# Patient Record
Sex: FEMALE | Race: WHITE | Employment: UNEMPLOYED | URBAN - METROPOLITAN AREA
[De-identification: names, ages, dates, MRNs, and addresses within clinical notes are randomized per-mention and may not be internally consistent; named-entity substitution may affect disease eponyms.]

---

## 2019-03-07 ENCOUNTER — HOSPITAL ENCOUNTER (OUTPATIENT)
Dept: ULTRASOUND IMAGING | Age: 59
Discharge: HOME | End: 2019-03-09
Payer: MEDICAID

## 2019-03-07 DIAGNOSIS — R10.11 RIGHT UPPER QUADRANT PAIN: ICD-10-CM

## 2019-03-07 PROCEDURE — 76700 US EXAM ABDOM COMPLETE: CPT

## 2019-03-12 ENCOUNTER — HOSPITAL ENCOUNTER (OUTPATIENT)
Dept: LAB | Age: 59
Discharge: HOME | End: 2019-03-12
Payer: MEDICAID

## 2019-03-12 LAB
ALBUMIN SERPL-MCNC: 4.2 G/DL (ref 3.5–4.6)
ALP BLD-CCNC: 93 U/L (ref 40–130)
ALT SERPL-CCNC: 14 U/L (ref 0–33)
ANION GAP SERPL CALCULATED.3IONS-SCNC: 18 MEQ/L (ref 9–15)
AST SERPL-CCNC: 20 U/L (ref 0–35)
BASOPHILS ABSOLUTE: 0.1 K/UL (ref 0–0.2)
BASOPHILS RELATIVE PERCENT: 1 %
BILIRUB SERPL-MCNC: 0.3 MG/DL (ref 0.2–0.7)
BUN BLDV-MCNC: 16 MG/DL (ref 8–23)
C-REACTIVE PROTEIN: 1.3 MG/L (ref 0–5)
CALCIUM SERPL-MCNC: 8.9 MG/DL (ref 8.5–9.9)
CHLORIDE BLD-SCNC: 102 MEQ/L (ref 95–107)
CO2: 22 MEQ/L (ref 20–31)
CREAT SERPL-MCNC: 0.69 MG/DL (ref 0.5–0.9)
EOSINOPHILS ABSOLUTE: 0.1 K/UL (ref 0–0.7)
EOSINOPHILS RELATIVE PERCENT: 1.8 %
GFR AFRICAN AMERICAN: >60
GFR NON-AFRICAN AMERICAN: >60
GLOBULIN: 3 G/DL (ref 2.3–3.5)
GLUCOSE BLD-MCNC: 100 MG/DL (ref 70–99)
HCT VFR BLD CALC: 44.1 % (ref 37–47)
HEMOGLOBIN: 14.3 G/DL (ref 12–16)
LIPASE: 54 U/L (ref 12–95)
LYMPHOCYTES ABSOLUTE: 1.6 K/UL (ref 1–4.8)
LYMPHOCYTES RELATIVE PERCENT: 27.5 %
MCH RBC QN AUTO: 28.1 PG (ref 27–31.3)
MCHC RBC AUTO-ENTMCNC: 32.3 % (ref 33–37)
MCV RBC AUTO: 86.9 FL (ref 82–100)
MONOCYTES ABSOLUTE: 0.4 K/UL (ref 0.2–0.8)
MONOCYTES RELATIVE PERCENT: 6.2 %
NEUTROPHILS ABSOLUTE: 3.6 K/UL (ref 1.4–6.5)
NEUTROPHILS RELATIVE PERCENT: 63.5 %
PDW BLD-RTO: 13.6 % (ref 11.5–14.5)
PLATELET # BLD: 232 K/UL (ref 130–400)
POTASSIUM SERPL-SCNC: 3.3 MEQ/L (ref 3.4–4.9)
RBC # BLD: 5.07 M/UL (ref 4.2–5.4)
SODIUM BLD-SCNC: 142 MEQ/L (ref 135–144)
TOTAL PROTEIN: 7.2 G/DL (ref 6.3–8)
WBC # BLD: 5.7 K/UL (ref 4.8–10.8)

## 2019-03-12 PROCEDURE — 86140 C-REACTIVE PROTEIN: CPT

## 2019-03-12 PROCEDURE — 80053 COMPREHEN METABOLIC PANEL: CPT

## 2019-03-12 PROCEDURE — 85025 COMPLETE CBC W/AUTO DIFF WBC: CPT

## 2019-03-12 PROCEDURE — 83690 ASSAY OF LIPASE: CPT

## 2019-03-12 PROCEDURE — 36415 COLL VENOUS BLD VENIPUNCTURE: CPT

## 2022-10-12 ENCOUNTER — HOSPITAL ENCOUNTER (OUTPATIENT)
Dept: PHYSICAL THERAPY | Age: 62
Setting detail: THERAPIES SERIES
Discharge: HOME | End: 2022-10-12
Payer: MEDICAID

## 2022-10-12 PROCEDURE — 97161 PT EVAL LOW COMPLEX 20 MIN: CPT

## 2022-10-12 ASSESSMENT — PAIN DESCRIPTION - PAIN TYPE: TYPE: CHRONIC PAIN

## 2022-10-12 ASSESSMENT — PAIN DESCRIPTION - ORIENTATION: ORIENTATION: LEFT

## 2022-10-12 ASSESSMENT — PAIN DESCRIPTION - LOCATION: LOCATION: KNEE

## 2022-10-12 ASSESSMENT — PAIN SCALES - GENERAL: PAINLEVEL_OUTOF10: 0

## 2022-10-12 NOTE — PROGRESS NOTES
[facility]  PHYSICAL THERAPY EVALUATION    Physical Therapy: Initial Evaluation    Patient: Lucila Buckner (19 y.o.     female)   Examination Date: 10/12/2022   :  1960 ;    Concetta Weston MRN: [id number]  CSN: [id number]   Insurance: Payor: Jazmyn Minor / Plan: Jazmyn Minor / Product Type: *No Product type* /   Insurance ID: [id number] - (Medicaid Managed) Secondary Insurance (if applicable):    Referring Physician: Christie Samuels MD       Visits to Date/Visits Approved: 1 /  (Evaluation Only)    No Show/Cancelled Appts: 0 / 0     Medical Diagnosis: Arthritis of knee, left [M17.12]        Treatment Diagnosis: Decreased ROM, decreased strength, difficulty with ambulation     PERTINENT MEDICAL HISTORY   Patient Assessed for Rehabilitation Services: Yes       Medical History: Chart Reviewed: Yes No past medical history on file. Surgical History: No past surgical history on file. Medications: No current outpatient medications on file. Allergies: Patient has no allergy information on record. SUBJECTIVE EXAMINATION     History obtained from[de-identified] Patient, Chart Review,      Family/Caregiver Present: No    Subjective History: Onset Date:  (2-3 years ago)  Subjective: Patient presents to therapy for L knee pain, stated she had a previous injury to the knee when she was young, and due to dancing stating she re-injured her knee about 2-3 years ago. Patient has hx of therapy for L knee.   Additional Pertinent Hx (if applicable): Circulatory Problems, B/B control (constipation)   Prior diagnostic testing[de-identified] MRI  Previous treatments prior to current episode?: Brace, Outpatient OT      Learning/Language: Learning  Does the patient/guardian have any barriers to learning?: No barriers  Will there be a co-learner?: No  What is the preferred language of the patient/guardian?: English  Is an  required?: No  How does the patient/guardian prefer to learn new concepts?: Listening     Pain Screening    Pain Screening  Patient Currently in Pain: Yes  Pain Assessment: 0-10  Pain Level: 0  Best Pain Level: 0  Worst Pain Level: 4  Pain Type: Chronic pain  Pain Location: Knee  Pain Orientation: Left  Aggravating factors: Kneeling  Pain Management/Relieving Factors: Laying supine    Functional Status    Social History:    Social History  Lives With: Alone  Type of Home: Apartment  Home Layout: One level  Home Access: Elevator    Occupation/Interests:   Occupation: On disability  Leisure & Hobbies: Dancing, running    Prior Level of Function:     Independent        Current Level of Function:   75% CLOF      ADL Assistance: Independent  Homemaking Assistance: Independent  Homemaking Responsibilities: Yes  Ambulation Assistance: Independent  Transfer Assistance: Independent  Active : Yes         OBJECTIVE EXAMINATION     Review of Systems:  Vision: Within Functional Limits  Hearing: Within functional limits  Overall Orientation Status: Within Normal Limits  Follows Commands: Within Functional Limits    Observations:   General Observations  Description: L knee brace donned    Palpation:   Right Knee Palpation: No TTP  Left Knee Palpation: Inc TTP over ITB insertion and lateral hamstring insertion, Inc TTP over ITB    Mobility:   Ambulation  Surface: Carpet  Device: No Device  Assistance: Independent  Quality of Gait: slow rosamaria, decreased WB on LLE  Distance: 50'  More Ambulation?: No  Stairs/Curb  Stairs?: Yes  Stairs  # Steps : 4  Stairs Height: 6\"  Rails: Bilateral  Device: No Device  Assistance: Independent  Comment: ascend/descend reciprocally with raisa UE support    Left AROM  Right AROM         AROM LLE (degrees)  L Knee Flexion 0-145: 105  L Knee Extension 0: -3    AROM RLE (degrees)  R Knee Flexion 0-145: 121  R Knee Extension 0: 0      Left Strength  Right Strength         Strength LLE  L Hip Flexion: 3/5  L Hip Extension: 3/5  L Hip ABduction: 3+/5  L Knee Flexion: 4/5  L Knee Extension: 3+/5    Strength RLE  R Hip Flexion: 3+/5  R Hip Extension: 2+/5  R Hip ABduction: 3+/5  R Knee Flexion: 5/5  R Knee Extension: 4-/5     Muscle Length/Flexibility:   Muscle Length LE  90/90 SLR (Hamstring Tightness): decreased hamstring flexibility  Right Rectus Femoris: WNL  Left Rectus Femoris: WNL (limited by L knee pain)    Joint Mobility:   Joint Integrity Knee  Right Patella Glides: decreased medial glide  Left Patella Glides: lateral tracking, decreased medial glide    Special Tests:   Special Tests for Knee  Valgus Stress Test (MCL injury): L (-), R (-)  Varus Stress Test (LCL injury): R (-), L (-)  Ant. Drawer (ACL injury): L (-)  Linda (Meniscus Lesion): R (-), L (-)    Outcomes Score:  Exam: LEFS: 43/80     Treatment:    Exercises:   Exercises  Exercise 1: Aquatics*  Exercise 2: gait drills*  Exercise 3: step-ups*  Exercise 4: hamstring str*  Exercise 5: sink exercises*  Exercise 6: SLS activities*    *Indicates exercise,modality, or manual techniques to be initiated when appropriate       ASSESSMENT     Impression: Assessment: Patient is a 58 yo female presenting to therapy for L knee pain. Patient presented with decreased L knee flexion and extension ROM compared to R LE. Patient also demonstrated decreased strength in raisa hips and knees. Patient presented with minor gait deficits limiting her ambulation. Skilled therapy is indicated to improve stated deficits to improve functional mobility and return to previous activities such as dancing. Body Structures, Functions, Activity Limitations Requiring Skilled Therapeutic Intervention: Decreased ROM, Decreased strength, Increased pain    Statement of Medical Necessity: Physical Therapy is both indicated and medically necessary as outlined in the POC to increase the likelihood of meeting the functionally related goals stated below.      Patient's Activity Tolerance: Patient tolerated evaluation without incident      Patient's rehabilitation potential/prognosis is considered to be: Fair, Good    Factors which may impact rehabilitation potential include: Chronicity of problem  Measures taken to address barrier(s): See Patient Education  Patient Education: Goals, PT Role, Plan of Care, Evaluative findings, Insurance      GOALS   Patient Goal(s): Patient Goals : \"Bending more than 90 degrees, dancing, Running\"    Long Term Goals Completed by 6-8 weeks Goal Status   LTG 1 Patient will be indep with HEP in order to make further progression upon d/c of skilled PT New   LTG 2 Patient will improve L knee ROM to 0-120 degrees to equal opposite side to improve functional mobility during ambulation New   LTG 3 Patient will improve LE and hip strength to 4/5 to demonstrate improvements in strength to increase endurance and mobility in the community and at home New   LTG 4 Paitent will improve LEFS by >/=10 points to demonstrate improvements in QOL New        TREATMENT PLAN       Requires PT Follow-Up: Yes    Treatment may include any combination of the following: Strengthening, ROM, Balance training, Functional mobility training, Gait training, Stair training, Neuromuscular re-education, Manual Therapy - Soft Tissue Mobilization, Manual Therapy - Joint Manipulation, Pain management, Home exercise program, Patient/Caregiver education & training, Modalities, Aquatics, Therapeutic activities     Frequency / Duration:  Patient to be seen 1x week times per week for 6-8 weeks weeks  Plan Comment:    Start in pool and transition to land          Eval Complexity:   Decision Making: Low Complexity  History: Personal Factors and/or Comorbidities Impacting POC: Medium  History: circulatory problems, constipation  Examination of body system(s) including body structures and functions, activity limitations, and/or participation restrictions: Medium  Exam: LEFS: 43/80  Clinical Presentation: Low  Clinical Presentation: stable    POST-PAIN     Pain Rating (0-10 pain scale):   \"no change\"/10  Location and pain description same as pre-treatment unless indicated. Action: [x] NA  [] Call Physician  [] Perform HEP  [] Meds as prescribed    Evaluation and patient rights have been reviewed and patient agrees with plan of care. Yes  [x]  No  []   Explain:     Cifuentes Fall Risk Assessment  Risk Factor Scale  Score   History of Falls [] Yes  [x] No 25  0 0   Secondary Diagnosis [] Yes  [x] No 15  0 0   Ambulatory Aid [] Furniture  [] Crutches/cane/walker  [x] None/bedrest/wheelchair/nurse 30  15  0 0   IV/Heparin Lock [] Yes  [x] No 20  0 0   Gait/Transferring [] Impaired  [x] Weak  [] Normal/bedrest/immobile 20  10  0 10   Mental Status [] Forgets limitations  [x] Oriented to own ability 15  0 0      Total:10     Based on the Assessment score: check the appropriate box.   [x]  No intervention needed   Low =   Score of 0-24  []  Use standard prevention interventions Moderate =  Score of 24-44   [] Discuss fall prevention strategies   [] Indicate moderate falls risk on eval  []  Use high risk prevention interventions High = Score of 45 and higher   [] Discuss fall prevention strategies   [] Provide supervision during treatment time      Minutes:  PT Individual Minutes  Time In: 1008  Time Out: 1050  Minutes: 42  Timed Code Treatment Minutes: 0 Minutes  Procedure Minutes: 42 min evaluation    Electronically signed by Mir Obrien PT on 10/12/22 at 12:02 PM EDT

## 2022-10-12 NOTE — PROGRESS NOTES
[address], [address]     Ph: [phone]  Fax: [phone]      [x] Certification  [] Recertification [x]  Plan of Care  [] Progress Note [] Discharge      Referring Provider: Grace Mcgraw MD     From:  Ashley Espinoza, PT,DPT  Patient: Ginger Goyal (70 y.o. female) : 1960 Date: 10/12/2022  Medical Diagnosis: Arthritis of knee, left [M17.12]       Treatment Diagnosis: Decreased ROM, decreased strength, difficulty with ambulation    Plan of Care/Certification Expiration Date: :     Progress Report Period from:  10/12/2022  to 10/12/2022    Visits to Date: 1 No Show: 0 Cancelled Appts: 0    OBJECTIVE:     Long Term Goals - Time Frame for Long Term Goals : 6-8 weeks  Goals Current/ Discharge status Status   Long Term Goal 1: Patient will be indep with HEP in order to make further progression upon d/c of skilled PT Will administer at later date New   Long Term Goal 2: Patient will improve L knee ROM to 0-120 degrees to equal opposite side to improve functional mobility during ambulation AROM LLE (degrees)  L Knee Flexion 0-145: 105  L Knee Extension 0: -3   AROM RLE (degrees)  R Knee Flexion 0-145: 121  R Knee Extension 0: 0  New   Long Term Goal 3: Patient will improve LE and hip strength to 4/5 to demonstrate improvements in strength to increase endurance and mobility in the community and at home Strength LLE  L Hip Flexion: 3/5  L Hip Extension: 3/5  L Hip ABduction: 3+/5  L Knee Flexion: 4/5  L Knee Extension: 3+/5  Strength RLE  R Hip Flexion: 3+/5  R Hip Extension: 2+/5  R Hip ABduction: 3+/5  R Knee Flexion: 5/5  R Knee Extension: 4-/5  New   Long Term Goal 4: Paitent will improve LEFS by >/=10 points to demonstrate improvements in QOL LEFS: 43/80 New     Body Structures, Functions, Activity Limitations Requiring Skilled Therapeutic Intervention: Decreased ROM, Decreased strength, Increased pain  Assessment: Patient is a 56 yo female presenting to therapy for L knee pain. Patient presented with decreased L knee flexion and extension ROM compared to R LE. Patient also demonstrated decreased strength in raisa hips and knees. Patient presented with minor gait deficits limiting her ambulation. Skilled therapy is indicated to improve stated deficits to improve functional mobility and return to previous activities such as dancing. Therapy Prognosis: Shamika Stevens      PT Education: Goals;PT Role;Plan of Care;Evaluative findings; Insurance    PLAN: [x] Evaluate and Treat  Frequency/Duration:  Plan Frequency: 1x week  Plan weeks: 6-8 weeks  Current Treatment Recommendations: Strengthening, ROM, Balance training, Functional mobility training, Gait training, Stair training, Neuromuscular re-education, Manual Therapy - Soft Tissue Mobilization, Manual Therapy - Joint Manipulation, Pain management, Home exercise program, Patient/Caregiver education & training, Modalities, Aquatics, Therapeutic activities  Additional Comments: Start in pool and transition to land                   Patient Status:[x] Continue/ Initiate plan of Care    [] Discharge PT. Recommend pt continue with HEP. [] Additional visits requested, Please re-certify for additional visits:    [] Hold         Signature: Electronically signed by Irais Arroyo PT on 10/12/22 at 12:04 PM EDT      If you have any questions or concerns, please don't hesitate to call. Thank you for your referral.    I have reviewed this plan of care and certify a need for medically necessary rehabilitation services.     Physician Signature:__________________________________________________________  Date:  Please sign and return

## 2022-10-26 ENCOUNTER — HOSPITAL ENCOUNTER (OUTPATIENT)
Dept: PHYSICAL THERAPY | Age: 62
Setting detail: THERAPIES SERIES
End: 2022-10-26
Payer: MEDICAID

## 2022-10-26 PROCEDURE — 97110 THERAPEUTIC EXERCISES: CPT

## 2022-10-26 NOTE — PROGRESS NOTES
[facility]  Outpatient Physical Therapy    Treatment Note        Date: 10/27/2022  Patient: Deonna Larson  : 1960   Confirmed: Yes  MRN: [id number]  Referring Provider: Fish Byers MD    Medical Diagnosis: Arthritis of knee, left [M17.12]       Treatment Diagnosis: Decreased ROM, decreased strength, difficulty with ambulation    Visit Information:  Insurance: Payor: [facility] / Plan: [facility] / Product Type: *No Product type* /   PT Visit Information  Onset Date:  (2-3 years ago)  PT Insurance Information: [employer]  Total # of Visits Approved:  (32 units until 2022)  Total # of Visits to Date: 2  Plan of Care/Certification Expiration Date: 22  No Show: 0  Progress Note Due Date: 22  Canceled Appointment: 0  Progress Note Counter: -, (3/32 used approved from 10/12-)    Subjective Information:  Subjective: no pain currently    HEP Compliance:  [] Good [] Fair [] Poor [x] Reports not doing due to:issued today         Treatment:  Exercises:  Exercises  Exercise 4: hamstring str 30 sec x 3, longsit  Exercise 6: SLS's 20 sec x 3, b/l  Exercise 7: Nu-Step L-3, 5 min  Exercise 8: SLS high knee march w/ 3 sec hold x 3 laps at counter  Exercise 9: Mod Umesh stretch 20 sec x 3 w/ strap  Exercise 10: SLR 10, b/l  Exercise 11: bridges 3 sec x 10  Exercise 12: BOSU lunges, fwd, 3 sec x 10  Exercise 20: HEP issued     Modalities:  Cryotherapy (CPT 15152)  Patient Position: Seated  Number Minutes Cryotherapy: 10  Cryotherapy location: Knee, Left  Post treatment skin assessment: Intact       *Indicates exercise, modality, or manual techniques to be initiated when appropriate    Objective Measures:        Strength: [x] NT  [] MMT completed:      ROM: [x] NT  [] ROM measurements:         Assessment:    Body Structures, Functions, Activity Limitations Requiring Skilled Therapeutic Intervention: Decreased ROM, Decreased strength, Increased pain  Assessment: performed land based ex this date, initiated LE strengthening and balance ex. vc's for technique, SLS's performed w/ 2 finger support, good tolerance, conclude w/ CP toleft knee to reduce soreness. Treatment Diagnosis: Decreased ROM, decreased strength, difficulty with ambulation  Therapy Prognosis: Fair, Good      Post-Pain Assessment:       Pain Rating (0-10 pain scale):  0 /10   Location and pain description same as pre-treatment unless indicated. Action: [] NA   [x] Perform HEP  [] Meds as prescribed  [] Modalities as prescribed   [] Call Physician     GOALS   Patient Goal(s): Patient Goals : \"Bending more than 90 degrees, dancing, Running\"    Short Term Goals Completed by   Goal Status       Long Term Goals Completed by 6-8 weeks Goal Status   LTG 1 Patient will be indep with HEP in order to make further progression upon d/c of skilled PT In progress   LTG 2 Patient will improve L knee ROM to 0-120 degrees to equal opposite side to improve functional mobility during ambulation In progress   LTG 3 Patient will improve LE and hip strength to 4/5 to demonstrate improvements in strength to increase endurance and mobility in the community and at home In progress   LTG 4 Paitent will improve LEFS by >/=10 points to demonstrate improvements in QOL In progress            Plan:  Frequency/Duration:  Plan  Plan Frequency: 1x week  Plan weeks: 6-8 weeks  Current Treatment Recommendations: Strengthening, ROM, Balance training, Functional mobility training, Gait training, Stair training, Neuromuscular re-education, Manual Therapy - Soft Tissue Mobilization, Manual Therapy - Joint Manipulation, Pain management, Home exercise program, Patient/Caregiver education & training, Modalities, Aquatics, Therapeutic activities  Additional Comments: Start in pool and transition to land  Pt to continue current HEP.   See objective section for any therapeutic exercise changes, additions or modifications this date.     Therapy Time:      PT Individual Minutes  Time In: 9911  Time Out: 0130  Minutes: 48  Timed Code Treatment Minutes: 38 Minutes  Procedure Minutes:CP 10 min  Timed Activity Minutes Units   Ther Ex 38 3     Electronically signed by Verna Higgins PTA on 10/26/22 at 9:01 AM EDT

## 2022-11-02 ENCOUNTER — HOSPITAL ENCOUNTER (OUTPATIENT)
Dept: PHYSICAL THERAPY | Age: 62
Setting detail: THERAPIES SERIES
Discharge: HOME | End: 2022-11-02

## 2022-11-02 NOTE — PROGRESS NOTES
Therapy                            Cancellation/No-show Note    Date: 2022  Patient: Jayme Mabry (77 y.o. female)  : 1960  MRN:  [id number]  Referring Physician: Bulah Mohs, MD    Medical Diagnosis: Arthritis of knee, left [M17.12]      Visit Information:  Insurance: Payor: [facility] / Plan: [facility] / Product Type: *No Product type* /   Visits to Date: 2   No Show/Cancelled Appts: 0       For today's appointment patient:  [x]  Cancelled  []  Rescheduled appointment  []  No-show   []  Called pt to remind of next appointment     Reason given by patient:  []  Patient ill  []  Conflicting appointment  []  No transportation    []  Conflict with work  []  No reason given  [x]  Other:      [] Pt has future appointments scheduled, no follow up needed  [] Pt requests to be on hold. Reason:   If > 2 weeks please discuss with therapist.  [] Therapist to call pt for follow up     Comments:   Patient arrived for aquatic session this date and attempted to get into the pool, however d/t fear of the water patient unable to get all the way into the pool to complete skilled therapy. Cx appointment d/t time restraints this date. Discussed with patient about transitioning to land d/t current circumstance, patient agreed.      Signature: Electronically signed by Casie Skelton PT on 22 at 10:00 AM EDT

## 2022-11-09 ENCOUNTER — HOSPITAL ENCOUNTER (OUTPATIENT)
Dept: PHYSICAL THERAPY | Age: 62
Setting detail: THERAPIES SERIES
Discharge: HOME | End: 2022-11-09

## 2022-11-09 NOTE — PROGRESS NOTES
Therapy                            Cancellation/No-show Note    Date: 2022  Patient: Stephenie Montoya (83 y.o. female)  : 1960  MRN:  [id number]  Referring Physician: Milka Caraballo MD    Medical Diagnosis: Arthritis of knee, left [M17.12]      Visit Information:  Insurance: Payor: [facility] / Plan: [facility] / Product Type: *No Product type* /   Visits to Date: 2   No Show/Cancelled Appts: 0       For today's appointment patient:  [x]  Cancelled  []  Rescheduled appointment  []  No-show   []  Called pt to remind of next appointment     Reason given by patient:  []  Patient ill  [x]  Conflicting appointment  []  No transportation    []  Conflict with work  []  No reason given  []  Other:      [x] Pt has future appointments scheduled, no follow up needed  [] Pt requests to be on hold.     Reason:   If > 2 weeks please discuss with therapist.  [] Therapist to call pt for follow up     Comments:       Signature: Electronically signed by Caryle Friedman, PT on 22 at 1:08 PM EST

## 2022-11-16 ENCOUNTER — HOSPITAL ENCOUNTER (OUTPATIENT)
Dept: PHYSICAL THERAPY | Age: 62
Setting detail: THERAPIES SERIES
Discharge: HOME | End: 2022-11-16

## 2022-11-16 NOTE — PROGRESS NOTES
Therapy                            Cancellation/No-show Note    Date: 2022  Patient: Katlyn Mcgowan (62 y.o. female)  : 1960  MRN:  [id number]  Referring Physician: Brooks Ordaz MD    Medical Diagnosis: Arthritis of knee, left [M17.12]      Visit Information:  Insurance: Payor: [facility] / Plan: [address] / Product Type: *No Product type* /   Visits to Date: 2   No Show/Cancelled Appts: 0 / 3      For today's appointment patient:  [x]  Cancelled  []  Rescheduled appointment  []  No-show   []  Called pt to remind of next appointment     Reason given by patient:  []  Patient ill  []  Conflicting appointment  [x]  No transportation    []  Conflict with work  []  No reason given  []  Other:      [x] Pt has future appointments scheduled, no follow up needed  [] Pt requests to be on hold.     Reason:   If > 2 weeks please discuss with therapist.  [] Therapist to call pt for follow up     Comments:       Signature: Electronically signed by Woodrow Weldon PTA on 22 at 2:53 PM EST

## 2023-10-19 PROBLEM — M17.10 ARTHRITIS OF KNEE: Status: ACTIVE | Noted: 2023-10-19

## 2023-10-19 PROBLEM — M62.81 MUSCLE WEAKNESS: Status: ACTIVE | Noted: 2023-10-19

## 2023-10-19 PROBLEM — M25.569 KNEE PAIN: Status: ACTIVE | Noted: 2023-10-19

## 2023-10-19 PROBLEM — M25.561 RIGHT KNEE PAIN: Status: ACTIVE | Noted: 2023-10-19

## 2023-10-19 PROBLEM — M17.9 DEGENERATIVE ARTHRITIS OF KNEE: Status: ACTIVE | Noted: 2023-10-19

## 2023-10-19 PROBLEM — R14.0 ABDOMINAL BLOATING: Status: ACTIVE | Noted: 2023-10-19

## 2023-10-19 PROBLEM — M25.562 LEFT KNEE PAIN: Status: ACTIVE | Noted: 2023-10-19

## 2023-10-19 PROBLEM — M23.90 DERANGEMENT OF KNEE: Status: ACTIVE | Noted: 2023-10-19

## 2023-10-19 PROBLEM — M62.89 MUSCLE TIGHTNESS: Status: ACTIVE | Noted: 2023-10-19

## 2023-10-19 PROBLEM — S83.90XA KNEE SPRAIN: Status: ACTIVE | Noted: 2023-10-19

## 2023-10-19 PROBLEM — M23.90 INTERNAL DERANGEMENT OF KNEE: Status: ACTIVE | Noted: 2023-10-19

## 2023-10-19 PROBLEM — R14.3 EXCESSIVE FLATUS: Status: ACTIVE | Noted: 2023-10-19

## 2023-10-19 PROBLEM — S83.242A TEAR OF MEDIAL MENISCUS OF LEFT KNEE: Status: ACTIVE | Noted: 2023-10-19

## 2023-10-19 RX ORDER — LOPERAMIDE HCL 2 MG
TABLET ORAL
COMMUNITY
Start: 2022-05-18

## 2023-10-20 ENCOUNTER — OFFICE VISIT (OUTPATIENT)
Dept: ORTHOPEDIC SURGERY | Facility: CLINIC | Age: 63
End: 2023-10-20
Payer: COMMERCIAL

## 2023-10-20 DIAGNOSIS — M17.10 ARTHRITIS OF KNEE: Primary | ICD-10-CM

## 2023-10-20 PROCEDURE — 1125F AMNT PAIN NOTED PAIN PRSNT: CPT | Performed by: INTERNAL MEDICINE

## 2023-10-20 PROCEDURE — 1159F MED LIST DOCD IN RCRD: CPT | Performed by: INTERNAL MEDICINE

## 2023-10-20 PROCEDURE — 99213 OFFICE O/P EST LOW 20 MIN: CPT | Performed by: INTERNAL MEDICINE

## 2023-10-20 ASSESSMENT — PAIN SCALES - GENERAL: PAINLEVEL_OUTOF10: 1

## 2023-10-20 ASSESSMENT — PAIN - FUNCTIONAL ASSESSMENT: PAIN_FUNCTIONAL_ASSESSMENT: 0-10

## 2023-10-20 NOTE — PROGRESS NOTES
Acute Injury New Patient Visit    CC:   Chief Complaint   Patient presents with    Left Knee - Follow-up     DJD    Right Knee - Follow-up     DJD       HPI: Jessica is a 63 y.o. female here for well knee pain second known arthritis.  Doing physical therapy has some mild pain on and off.  Here for follow-up.        Review of Systems   GENERAL: Negative for malaise, significant weight loss, fever  MUSCULOSKELETAL: See HPI  NEURO:  Negative for numbness / tingling     Past Medical History  History reviewed. No pertinent past medical history.    Medication review  Medication Documentation Review Audit       Reviewed by Asiya Ngo CMA (Medical Assistant) on 10/20/23 at 1358      Medication Order Taking? Sig Documenting Provider Last Dose Status   loperamide (Imodium A-D) 2 mg tablet [id number]  2 tab(s) orally once, then take one tablet by mouth after each loose stool. Maximum Four tabs in a 24-hour period Historical Provider, MD  Active                    Allergies  No Known Allergies    Social History  Social History     Socioeconomic History    Marital status:      Spouse name: Not on file    Number of children: Not on file    Years of education: Not on file    Highest education level: Not on file   Occupational History    Not on file   Tobacco Use    Smoking status: Some Days     Types: Cigarettes    Smokeless tobacco: Not on file   Substance and Sexual Activity    Alcohol use: Yes    Drug use: Never    Sexual activity: Not on file   Other Topics Concern    Not on file   Social History Narrative    Not on file     Social Determinants of Health     Financial Resource Strain: Not on file   Food Insecurity: Not on file   Transportation Needs: Not on file   Physical Activity: Not on file   Stress: Not on file   Social Connections: Not on file   Intimate Partner Violence: Not on file   Housing Stability: Not on file       Surgical History  History reviewed. No pertinent surgical history.    Physical  Exam:  GENERAL:  Patient is awake, alert, and oriented to person place and time.  Patient appears well nourished and well kept.  Affect Calm, Not Acutely Distressed.  HEENT:  Normocephalic, Atraumatic, EOMI  CARDIOVASCULAR:  Hemodynamically stable.  RESPIRATORY:  Normal respirations with unlabored breathing.  Extremity: Right knee shows skin is intact t amount of effusion.  She can flex right knee to 125 degrees full extension 0 degrees.  Negative valgus and varus stress test.  1-2+ patellar crepitus.  Discomfort with patellar grind test.  Positive patellar apprehension with instability.  Left knee shows skin is intact trace amount effusion.  He can flex the left knee to 125 degrees and full extension 0 degrees.  1-2+ patellar crepitus.  Positive patellar apprehension test with instability.  Bilateral foot examination shows pes planus of both the right and left foot.      Diagnostics: X-rays reviewed  XR knee  Interpreted By:  BROCK PAINTING MD  MRN: [id number]  Patient Name: JAH LEDESMA     STUDY:  KNEE; 3 VIEWS;  Right;  4/4/2023 10:02 am     INDICATION:  pain  M25.569: Knee pain.     ACCESSION NUMBER(S):  [id number]     ORDERING CLINICIAN:  BROCK PAINTING     FINDINGS:  Right knee x-rays three views AP, lateral and sunrise view: No acute  fractures, no dislocation. Mild-to-moderate degenerative changes at  the medial compartment with joint space narrowing and osteophyte  formation.         Procedure: None    Assessment: 1.  Bilateral knee osteoarthritis  2.  Bilateral pes planus    Plan: Jah presents today for bilateral knee pain secondary to knee osteoarthritis and known meniscal tear.  Does have pes planus of both feet, she would benefit from custom orthotics for both feet.  J braces of both knees for support stability.  Continue home PT program.  We discussed potential cortisone injection or Visco injection in the future.    No orders of the defined types were placed in this encounter.     At the conclusion  of the visit there were no further questions by the patient/family regarding their plan of care.  Patient was instructed to call or return with any issues, questions, or concerns regarding their injury and/or treatment plan described above.     10/20/23 at 2:22 PM - Shin Bradley MD    Office: [phone]    This note was prepared using voice recognition software.  The details of this note are correct and have been reviewed, and corrected to the best of my ability.  Some grammatical errors may persist related to the Dragon software.

## 2023-10-23 ENCOUNTER — TELEPHONE (OUTPATIENT)
Dept: ORTHOPEDIC SURGERY | Facility: CLINIC | Age: 63
End: 2023-10-23
Payer: COMMERCIAL

## 2023-10-25 ENCOUNTER — TELEPHONE (OUTPATIENT)
Dept: ORTHOPEDIC SURGERY | Facility: CLINIC | Age: 63
End: 2023-10-25
Payer: COMMERCIAL

## 2023-10-25 NOTE — TELEPHONE ENCOUNTER
10/25/23  [facility] for pt re Insurance benefits and OOP cost for knee braces ordered by Dr. Bradley.  Asked her to contact our office and schedule a fitting appt.

## 2023-10-26 ENCOUNTER — TELEPHONE (OUTPATIENT)
Dept: ORTHOPEDIC SURGERY | Facility: CLINIC | Age: 63
End: 2023-10-26
Payer: COMMERCIAL

## 2023-10-26 NOTE — TELEPHONE ENCOUNTER
10/26/23  Spoke w/ pt and scheduled her for fitting of [place] knee braces in [place] tomorrow afternoon.

## 2023-10-27 ENCOUNTER — APPOINTMENT (OUTPATIENT)
Dept: ORTHOPEDIC SURGERY | Facility: CLINIC | Age: 63
End: 2023-10-27
Payer: COMMERCIAL

## 2023-10-27 ENCOUNTER — TELEPHONE (OUTPATIENT)
Dept: ORTHOPEDIC SURGERY | Facility: CLINIC | Age: 63
End: 2023-10-27
Payer: COMMERCIAL

## 2023-10-27 NOTE — TELEPHONE ENCOUNTER
Patient called today to cancel her fitting appt and requested to just be fit at her follow up visit with Dr Bradley on 11/03/23.   
98.7

## 2023-11-03 ENCOUNTER — APPOINTMENT (OUTPATIENT)
Dept: ORTHOPEDIC SURGERY | Facility: CLINIC | Age: 63
End: 2023-11-03
Payer: COMMERCIAL

## 2023-11-16 ENCOUNTER — TELEPHONE (OUTPATIENT)
Dept: ORTHOPEDIC SURGERY | Facility: CLINIC | Age: 63
End: 2023-11-16
Payer: COMMERCIAL

## 2023-11-16 NOTE — TELEPHONE ENCOUNTER
11/16/23  Pt had cxd FUV w/ Dr. Bradley and LVM that she wanted to reschedule for knee brace fitting.  When I spoke w/ her today, she stated she has since acquired knee braces through [facility] which have made a big improvement.  However, she still wants to reschedule w/ Dr. Bradley to show him the braces.  I told her she should call the main number for scheduling that appt and bring the braces with her.  I also told her that more than likely, since insurance paid for the braces, they would not cover any dispensed through .  She understood and was appreciative of the call.

## 2023-11-21 ENCOUNTER — APPOINTMENT (OUTPATIENT)
Dept: ORTHOPEDIC SURGERY | Facility: CLINIC | Age: 63
End: 2023-11-21
Payer: COMMERCIAL

## 2024-01-09 ENCOUNTER — OFFICE VISIT (OUTPATIENT)
Dept: ORTHOPEDIC SURGERY | Facility: CLINIC | Age: 64
End: 2024-01-09
Payer: COMMERCIAL

## 2024-01-09 DIAGNOSIS — M17.10 ARTHRITIS OF KNEE: ICD-10-CM

## 2024-01-09 PROCEDURE — 1125F AMNT PAIN NOTED PAIN PRSNT: CPT | Performed by: INTERNAL MEDICINE

## 2024-01-09 PROCEDURE — L1812 KO ELASTIC W/JOINTS PRE OTS: HCPCS | Performed by: INTERNAL MEDICINE

## 2024-01-09 PROCEDURE — 99213 OFFICE O/P EST LOW 20 MIN: CPT | Performed by: INTERNAL MEDICINE

## 2024-01-09 NOTE — PROGRESS NOTES
CC:   Chief Complaint   Patient presents with    Right Knee - Follow-up     knee osteoarthritis   pes planus           HPI: Jessica is a 63 y.o. female presents for follow-up for right knee osteoarthritis and bilateral pes planus. She states that she misplaced a script for the orthotics.  No new issues today.  And also states she did benefit from physical therapy in the past and like to resume.        Review of Systems   GENERAL: Negative for malaise, significant weight loss, fever  MUSCULOSKELETAL: See HPI  NEURO:  Negative for numbness / tingling     Past Medical History  No past medical history on file.    Medication review  Medication Documentation Review Audit       Reviewed by Asiya Ngo CMA (Medical Assistant) on 10/20/23 at 1358      Medication Order Taking? Sig Documenting Provider Last Dose Status   loperamide (Imodium A-D) 2 mg tablet [id number]  2 tab(s) orally once, then take one tablet by mouth after each loose stool. Maximum Four tabs in a 24-hour period Historical Provider, MD  Active                    Allergies  No Known Allergies    Social History  Social History     Socioeconomic History    Marital status:      Spouse name: Not on file    Number of children: Not on file    Years of education: Not on file    Highest education level: Not on file   Occupational History    Not on file   Tobacco Use    Smoking status: Some Days     Types: Cigarettes    Smokeless tobacco: Not on file   Substance and Sexual Activity    Alcohol use: Yes    Drug use: Never    Sexual activity: Not on file   Other Topics Concern    Not on file   Social History Narrative    Not on file     Social Determinants of Health     Financial Resource Strain: Not on file   Food Insecurity: Not on file   Transportation Needs: Not on file   Physical Activity: Not on file   Stress: Not on file   Social Connections: Not on file   Intimate Partner Violence: Not on file   Housing Stability: Not on file       Surgical  History  No past surgical history on file.    Physical Exam:  GENERAL:  Patient is awake, alert, and oriented to person place and time.  Patient appears well nourished and well kept.  Affect Calm, Not Acutely Distressed.  HEENT:  Normocephalic, Atraumatic, EOMI  CARDIOVASCULAR:  Hemodynamically stable.  RESPIRATORY:  Normal respirations with unlabored breathing.  Extremity: Right knee shows skin is intact.  No erythema warmth.  There is no clinical signs of infection.  She can flex her right knee to 130 degrees and full extension is 0 degrees.  Mild pain and instability with valgus stress test.  Negative varus test.  Slight patellar instability.  Mild pain to the medial and lateral patella facets.  Patellar and quadricep mechanism intact.  Minimal pain over the medial and lateral joint line.  Negative Lachman's test.  She is able to satisfactory straight leg test.  Bilateral feet shows bilateral pes planus.      Diagnostics: x-rays reviewed  XR knee  Interpreted By:  BROCK PAINTING MD  MRN: [id number]  Patient Name: JAH LEDESMA     STUDY:  KNEE; 3 VIEWS;  Right;  4/4/2023 10:02 am     INDICATION:  pain  M25.569: Knee pain.     ACCESSION NUMBER(S):  [id number]     ORDERING CLINICIAN:  BROCK PAINTING     FINDINGS:  Right knee x-rays three views AP, lateral and sunrise view: No acute  fractures, no dislocation. Mild-to-moderate degenerative changes at  the medial compartment with joint space narrowing and osteophyte  formation.           Procedure: None    Assessment:   Right knee osteoarthritis  Bilateral pes planus    Plan: Jah presents for reevaluation for right knee osteoarthritis and pes planus. She is doing well. We will issue her a new script for orthotics, fit her for a light J brace for the right knee.  Will get him back some additional physical therapy which she responded to well in the past.  We discussed the possibility of future cortisone injection or gel injection but this point like to follow-up as  needed.    No orders of the defined types were placed in this encounter.     At the conclusion of the visit there were no further questions by the patient/family regarding their plan of care.  Patient was instructed to call or return with any issues, questions, or concerns regarding their injury and/or treatment plan described above.     01/09/24 at 9:17 AM - Shin Bradley MD  Scribe Attestation  By signing my name below, I, Rafael Hillmely, Scribe   attest that this documentation has been prepared under the direction and in the presence of Shin Bradley MD.   Office: [phone]    This note was prepared using voice recognition software.  The details of this note are correct and have been reviewed, and corrected to the best of my ability.  Some grammatical errors may persist related to the Dragon software.

## 2024-01-23 ENCOUNTER — EVALUATION (OUTPATIENT)
Dept: PHYSICAL THERAPY | Facility: CLINIC | Age: 64
End: 2024-01-23
Payer: COMMERCIAL

## 2024-01-23 DIAGNOSIS — R29.898 WEAKNESS OF BOTH LOWER EXTREMITIES: Primary | ICD-10-CM

## 2024-01-23 PROCEDURE — 97162 PT EVAL MOD COMPLEX 30 MIN: CPT | Mod: GP

## 2024-01-23 PROCEDURE — 97110 THERAPEUTIC EXERCISES: CPT | Mod: GP

## 2024-01-23 ASSESSMENT — ENCOUNTER SYMPTOMS
DEPRESSION: 0
OCCASIONAL FEELINGS OF UNSTEADINESS: 0
LOSS OF SENSATION IN FEET: 1

## 2024-01-23 ASSESSMENT — PATIENT HEALTH QUESTIONNAIRE - PHQ9
1. LITTLE INTEREST OR PLEASURE IN DOING THINGS: NOT AT ALL
2. FEELING DOWN, DEPRESSED OR HOPELESS: NOT AT ALL
SUM OF ALL RESPONSES TO PHQ9 QUESTIONS 1 AND 2: 0

## 2024-01-23 NOTE — PROGRESS NOTES
Physical Therapy Evaluation    Patient Name: Jessica Messina  MRN: [id number]    Current Problem  1. Weakness of both lower extremities          Insurance    Insurance reviewed   Visit number: 1  [facility] DUAL COMPLETE  BMN PT OT ST COPAY 0 (0) 240(0) COVERAGE 80  OOP 8850(0) NO AUTH REQ       Subjective   General:  Pt comes in today with B/L knee pain and stiffness. She states she has a brace that she wears. She states her stiffness started a long time ago. She states physical therapy has helped in the past. She states she still does her HEP from the past which did help. She states the hardest thing for her to do would be getting out of the car. She states getting on the ground and bending her knees would be the hardest for her. No other medical concerns at this time. Pt states she was taking Tiazadine for muscle spasms but she did not like how it was effecting her.   Occupation: Not currently working  PLOF: Independent with all activities  Goal for Therapy: Feel like herself again  Home Environment: Apartment, elevator, 2nd floor, 2 flights of stairs, lives alone     Precautions:    Pain:  REDUCES SYMPTOMS: Moving around, rest, doing her HEP      Reviewed medical screening form with pt and medical screening assessed    Imaging:   FINDINGS: Xray 4/4/23  Right knee x-rays three views AP, lateral and sunrise view: No acute  fractures, no dislocation. Mild-to-moderate degenerative changes at  the medial compartment with joint space narrowing and osteophyte  formation.  Objective     PALPATION: TTP lateral joint line on L  GAIT: pes planus BL, slow gait speed, antalgic gait  OBSERVATION: Pt wearing J brace on L side         Special Tests       Right Lachman Test: -  Left Lachman Test: -    Right Posterior drawer: -  Left Posterior drawer: -    Right Posterior sag: -  Left Posterior sag: -    Right Valgus Test 0 degrees: -  Left Valgus Test 0 degrees: -  Right Valgus Test 30 degrees: -  Left Valgus Test 30 degrees:  -  Right Varus Test 0 degrees: -  Left Varus Test 0 degrees: -  Right Varus Test 30 degrees: -  Left Varus Test 30 degrees: -  Patient with History of Catching or Locking:  Right -  Left +  Pain with Forced Hyperextension: Right -  Left +  Pain with Maximum Flexion: Right -  Left -  Jorden: Right -  Left -  Joint Line Tenderness: Right -  Left +                       ROM  Right knee flexion: 0-122   Left knee flexion: 0-120  Right knee extension: 0    Left knee extension: 0            MMT  Right quadriceps: 4    Left quadriceps: 4  Right iliopsoas: 4+    Left iliopsoas: 4+  Right gluteus medius: 4    Left gluteus medius: 4  Right hip adductors: 4    Left hip adductors: 4  Right gluteus racquel: 4-   Left gluteus racquel: 4-  Right hamstrin+   Left hamstrin+            Flexibility  Right hamstring: limited    Left hamstring: limited  Right gastrocnemius: WNL   Left gastrocnemius: WNL  Right quadriceps: limited   Left quadriceps: limited  Right iliopsoas: limited   Left iliopsoas: limited    Outcome Measures:   LEFS    Treatment: See HEP below    EDUCATION/HEP:  Access Code: [email or web address]  URL: [email or web address]  Date: 2024  Prepared by: Anna Goss    Exercises  - Supine Bridge  - 1 x daily - 7 x weekly - 2 sets - 10 reps  - Clamshell  - 1 x daily - 7 x weekly - 2 sets - 10 reps  - Modified Michael Stretch  - 1 x daily - 7 x weekly - 3 sets - 30s hold  - Mini Squat with Counter Support  - 1 x daily - 7 x weekly - 2 sets - 10 reps  Assessment:  62 y/o pt who presents with B/L knee stiffness, dec strength, dec functional mobility, and abnormal gait mechanics. Functional limitations include standing, walking, getting off of the floor, and self care tasks. Pt will benefit from skilled therapy in order to improve strength, functional mobility, and gait mechanics.    Clinical Presentation: Stable     Level of Complexity:  Moderate     Goals:  Pt will be  independent with HEP  Pt will demonstrate an increase of 9 points on the LEFS (MDC) in order to improve functional mobility  Pt will demonstrate an increase in global hip strength to 5/5 in order to return to ADLs  Pt will demonstrate an increase in global knee strength to 5/5 in order to improve functional mobility  Pt will be able to ascend/descend stairs in a reciprocal pattern in order to improve functional mobility  Pt will demonstrate improved SLS to >30s in order to return to ADLs      Plan  OP PT Plan  Treatment/Interventions: Cryotherapy, Education/ Instruction, Gait training, Manual therapy, Neuromuscular re-education, Self care/ home management, Therapeutic activities, Therapeutic exercises  PT Plan: Skilled PT  PT Frequency: 1 time per week  Duration: 6 weeks  Certification Period Start Date: 01/23/24  Certification Period End Date: 03/23/24  Rehab Potential: Good  Plan of Care Agreement: Patient

## 2024-01-31 ENCOUNTER — APPOINTMENT (OUTPATIENT)
Dept: PHYSICAL THERAPY | Facility: CLINIC | Age: 64
End: 2024-01-31
Payer: COMMERCIAL

## 2024-02-07 ENCOUNTER — TREATMENT (OUTPATIENT)
Dept: PHYSICAL THERAPY | Facility: CLINIC | Age: 64
End: 2024-02-07
Payer: COMMERCIAL

## 2024-02-07 DIAGNOSIS — R29.898 WEAKNESS OF BOTH LOWER EXTREMITIES: Primary | ICD-10-CM

## 2024-02-07 PROCEDURE — 97110 THERAPEUTIC EXERCISES: CPT | Mod: GP,CQ

## 2024-02-07 ASSESSMENT — PAIN SCALES - GENERAL: PAINLEVEL_OUTOF10: 0 - NO PAIN

## 2024-02-07 ASSESSMENT — PAIN - FUNCTIONAL ASSESSMENT: PAIN_FUNCTIONAL_ASSESSMENT: 0-10

## 2024-02-07 NOTE — PROGRESS NOTES
"Physical Therapy Treatment    Patient Name: Jessica Messina  MRN: [id number]  Today's Date: 2/7/2024  Time Calculation  Start Time: 1315  Stop Time: 1348  Time Calculation (min): 33 min    Insurance reviewed   Visit number: 2/6  [facility] DUAL COMPLETE  BMN PT OT ST COPAY 0 (0) 240(0) COVERAGE 80  OOP 8850(0) NO AUTH REQ     Current Problem  1. Weakness of both lower extremities            Subjective   General   Pt. States she feels great coming in.   Precautions     Pain  Pain Assessment: 0-10  Pain Score: 0 - No pain    Objective   Treatments:   Mike 6'  Bridges x20  SLRs Flex RLE only x20  SLRs Abd x20  LAQs x20  HR/TR x20  Squats x20  Stand hip 3-way x20  Step ups F/L 6\"x20  Mini lunges x10    Assessment:   Pt. Required verbal cues for proper technique while performing exercises w/ fair carryover. Pt. Stated she could not finish some exercises because she was \"unable to comply\". Pt. Stated she felt good after her treatment today.    Plan:   Continue w/ current POC.     OP EDUCATION:       Goals:     "

## 2024-02-14 ENCOUNTER — APPOINTMENT (OUTPATIENT)
Dept: PHYSICAL THERAPY | Facility: CLINIC | Age: 64
End: 2024-02-14
Payer: COMMERCIAL

## 2024-11-07 ENCOUNTER — OFFICE VISIT (OUTPATIENT)
Dept: ORTHOPEDIC SURGERY | Facility: CLINIC | Age: 64
End: 2024-11-07
Payer: COMMERCIAL

## 2024-11-07 DIAGNOSIS — M17.10 ARTHRITIS OF KNEE: Primary | ICD-10-CM

## 2024-11-07 PROCEDURE — 1159F MED LIST DOCD IN RCRD: CPT | Performed by: INTERNAL MEDICINE

## 2024-11-07 PROCEDURE — 99213 OFFICE O/P EST LOW 20 MIN: CPT | Performed by: INTERNAL MEDICINE

## 2024-11-07 NOTE — PROGRESS NOTES
CC:   Chief Complaint   Patient presents with    Right Knee - Follow-up     knee osteoarthritis   pes planus           HPI: Jessica is a 64 y.o. female presents today for reevaluation for right knee osteoarthritis and bilateral pes planus. She states that she is doing well, no pain currently. No new issues.         Review of Systems   GENERAL: Negative for malaise, significant weight loss, fever  MUSCULOSKELETAL: See HPI  NEURO:  Negative for numbness / tingling     Past Medical History  No past medical history on file.    Medication review  Medication Documentation Review Audit       Reviewed by Asiay Ngo CMA (Medical Assistant) on 10/20/23 at 1358      Medication Order Taking? Sig Documenting Provider Last Dose Status   loperamide (Imodium A-D) 2 mg tablet [id number]  2 tab(s) orally once, then take one tablet by mouth after each loose stool. Maximum Four tabs in a 24-hour period Historical Provider, MD  Active                    Allergies  No Known Allergies    Social History  Social History     Socioeconomic History    Marital status:      Spouse name: Not on file    Number of children: Not on file    Years of education: Not on file    Highest education level: Not on file   Occupational History    Not on file   Tobacco Use    Smoking status: Some Days     Types: Cigarettes    Smokeless tobacco: Not on file   Substance and Sexual Activity    Alcohol use: Yes    Drug use: Never    Sexual activity: Not on file   Other Topics Concern    Not on file   Social History Narrative    Not on file     Social Drivers of Health     Financial Resource Strain: Not on file   Food Insecurity: Not on file   Transportation Needs: Not on file   Physical Activity: Not on file   Stress: Not on file   Social Connections: Not on file   Intimate Partner Violence: Not on file   Housing Stability: Not on file       Surgical History  No past surgical history on file.    Physical Exam:  GENERAL:  Patient is awake,  alert, and oriented to person place and time.  Patient appears well nourished and well kept.  Affect Calm, Not Acutely Distressed.  HEENT:  Normocephalic, Atraumatic, EOMI  CARDIOVASCULAR:  Hemodynamically stable.  RESPIRATORY:  Normal respirations with unlabored breathing.  Extremity: Right knee examination shows skin is intact.  There is no erythema or warmth.  No effusion.  Can flex the right knee to 130 degrees.  Full extension at 0 degrees.  No pain over the medial joint line.  No pain over the lateral joint line.  There is no pain over the patellar or quadricep tendon.  No pain over the proximal tibia.  No pain over the popliteal fossa.  Negative valgus stress test.  Negative varus stress test.  Negative Jorden's test medially with no instability.  Negative Jorden's test laterally with no instability.  Negative Lachman's test.  Patellar and quadricep mechanism intact.  Negative anterior and posterior drawer test.  Negative patellar apprehension test.  Distal pulses are palpable, neurovascularly intact.  Walking with no significant antalgic gait.      Diagnostics: None today  XR knee  Interpreted By:  BROCK PAINTING MD  MRN: [id number]  Patient Name: JESSICA LEDESMA     STUDY:  KNEE; 3 VIEWS;  Right;  4/4/2023 10:02 am     INDICATION:  pain  M25.569: Knee pain.     ACCESSION NUMBER(S):  [id number]     ORDERING CLINICIAN:  BROCK PAINTING     FINDINGS:  Right knee x-rays three views AP, lateral and sunrise view: No acute  fractures, no dislocation. Mild-to-moderate degenerative changes at  the medial compartment with joint space narrowing and osteophyte  formation.           Procedure: None    Assessment:   Right knee osteoarthritis  Bilateral pes planus    Plan: Jessica presents for reevaluation for right knee osteoarthritis and pes planus. She is clinically doing well, no pain.  She has a clinically stable right knee examination, reassurance was given today to the patient.  Recommend she continue with a home PT  program, she will follow-up as needed.      No orders of the defined types were placed in this encounter.     At the conclusion of the visit there were no further questions by the patient/family regarding their plan of care.  Patient was instructed to call or return with any issues, questions, or concerns regarding their injury and/or treatment plan described above.     11/07/24 at 4:04 PM - Shin Bradley MD  Scribe Attestation  By signing my name below, I, Rafael Robertmo, Scribe   attest that this documentation has been prepared under the direction and in the presence of Shin Bradley MD.    Office: [phone]    This note was prepared using voice recognition software.  The details of this note are correct and have been reviewed, and corrected to the best of my ability.  Some grammatical errors may persist related to the Dragon software.